# Patient Record
Sex: FEMALE | Race: OTHER
[De-identification: names, ages, dates, MRNs, and addresses within clinical notes are randomized per-mention and may not be internally consistent; named-entity substitution may affect disease eponyms.]

---

## 2018-11-19 LAB
ADD MANUAL DIFF: YES
ANION GAP SERPL CALC-SCNC: 7 MMOL/L (ref 5–15)
APPEARANCE UR: (no result)
APTT BLD: 25 SEC (ref 23–33)
APTT PPP: (no result) S
BUN SERPL-MCNC: 13 MG/DL (ref 7–18)
CALCIUM SERPL-MCNC: 9.9 MG/DL (ref 8.5–10.1)
CHLORIDE SERPL-SCNC: 103 MMOL/L (ref 98–107)
CO2 SERPL-SCNC: 30 MMOL/L (ref 21–32)
CREAT SERPL-MCNC: 0.8 MG/DL (ref 0.55–1.3)
ERYTHROCYTE [DISTWIDTH] IN BLOOD BY AUTOMATED COUNT: 11.5 % (ref 11.6–14.8)
GLUCOSE UR STRIP-MCNC: NEGATIVE MG/DL
HCT VFR BLD CALC: 44.6 % (ref 37–47)
HGB BLD-MCNC: 14.8 G/DL (ref 12–16)
INR PPP: 0.9 (ref 0.9–1.1)
KETONES UR QL STRIP: NEGATIVE
LEUKOCYTE ESTERASE UR QL STRIP: (no result)
MCV RBC AUTO: 91 FL (ref 80–99)
NITRITE UR QL STRIP: POSITIVE
PH UR STRIP: 7 [PH] (ref 4.5–8)
PLATELET # BLD: 275 K/UL (ref 150–450)
POTASSIUM SERPL-SCNC: 4.1 MMOL/L (ref 3.5–5.1)
PROT UR QL STRIP: (no result)
RBC # BLD AUTO: 4.88 M/UL (ref 4.2–5.4)
SODIUM SERPL-SCNC: 140 MMOL/L (ref 136–145)
SP GR UR STRIP: 1.01 (ref 1–1.03)
UROBILINOGEN UR-MCNC: NORMAL MG/DL (ref 0–1)
WBC # BLD AUTO: 5.9 K/UL (ref 4.8–10.8)

## 2018-11-19 NOTE — DIAGNOSTIC IMAGING REPORT
Indication: Cough

 

Technique: 2 views of the chest

 

Comparison: None

 

Findings: There is a right chest port catheter in place. Lungs and pleural spaces are

clear. Heart size is normal.

 

Impression: No acute process

## 2018-11-20 NOTE — CARDIOLOGY REPORT
--------------- APPROVED REPORT --------------





EKG Measurement

Heart [id number]

DE [id number]

[id number]

[id number]

[id number]





Normal sinus rhythm

Normal ECG
I personally performed the service described in the documentation recorded by the scribe in my presence, and it accurately and completely records my words and actions.

## 2018-11-27 NOTE — PRE-OP HX & PHY REPO 2 SIG
DATE OF ADMISSION:  11/29/2018

PREOPERATIVE HISTORY AND PHYSICAL



SCHEDULED FOR SURGERY:  November 29, 2018.



HISTORY OF PRESENT ILLNESS:  The patient is a 48-year-old female in overall

good health with invasive ductal carcinoma of the left breast, metastatic

to left axillary lymph nodes.  The patient has completed a course of

neoadjuvant chemotherapy and is now scheduled to undergo left breast

partial mastectomy with preoperative needle localization and left axillary

lymph node biopsy.  The patient presented in May 2018 with a 4 x 3 cm mass

in the upper outer quadrant of the left breast 5 cm from the nipple, and 

enlarged axillary lymph nodes were palpable.  Core biopsy revealed

invasive ductal carcinoma, metastatic to the axillary lymph node, estrogen

and progesterone receptor positive, HER2 negative.  The patient's last

chemotherapy was October 19, 2018. 



MEDICATIONS:  Compazine, Zofran, Norco.



ALLERGIES:  None.



OPERATIONS:  None.  



REVIEW OF SYSTEMS: The patient is premenopausal.



ALLERGIES:  None.



OPERATIONS:  None.



PHYSICAL EXAMINATION:

GENERAL:  The patient is 5 foot 3 inches, 170 pounds.

HEENT:  Within normal limits.

LUNGS:  Clear.

HEART:  Regular rhythm.

BREASTS:  Right breast without palpable abnormality.  The left breast, no

palpable mass present any longer in the upper outer quadrant.

ABDOMEN:  Soft.

PELVIC AND RECTAL:  Per primary care Gynecology.

EXTREMITIES:  Without edema.

NEUROLOGIC:  Physiologic.



ADDITIONAL STUDIES:  Recent ultrasound November 1, 2018 revealed that the

left breast mass at 12 o'clock 4 cm from the nipple has markedly decreased

in size, less than 2 cm and the lobulated retroareolar mass thought to be

a satellite lesion is no longer present indicating complete response to

chemotherapy.



PLAN:  The patient is scheduled to undergo left breast partial mastectomy

with preoperative needle localization and left axillary lymph node

dissection.  I have had a full discussion with the patient regarding the

nature of her condition, the nature of the surgery, indications,

alternatives, options, and risks including bleeding, infection, injury to

adjacent structures or organs, scarring, need for additional procedures in

the breast or axilla based on final pathology, need for additional

treatment postoperatively including chemotherapy and radiation

therapy, need for drains, breast deformity, etc.  All questions have been

answered.  The patient understands and agrees to proceed.









  ______________________________________________

  Adalberto Villela M.D.





DR:  Patria

D:  11/27/2018 13:36

T:  11/27/2018 14:58

JOB#:  [id number]

CC:



JASVIR

## 2018-11-29 ENCOUNTER — HOSPITAL ENCOUNTER (INPATIENT)
Dept: HOSPITAL 72 - SUR | Age: 48
LOS: 3 days | Discharge: HOME | DRG: 362 | End: 2018-12-02
Payer: MEDICAID

## 2018-11-29 VITALS — SYSTOLIC BLOOD PRESSURE: 103 MMHG | DIASTOLIC BLOOD PRESSURE: 68 MMHG

## 2018-11-29 VITALS — BODY MASS INDEX: 28.88 KG/M2 | HEIGHT: 63 IN | WEIGHT: 163 LBS

## 2018-11-29 VITALS — SYSTOLIC BLOOD PRESSURE: 106 MMHG | DIASTOLIC BLOOD PRESSURE: 57 MMHG

## 2018-11-29 VITALS — SYSTOLIC BLOOD PRESSURE: 154 MMHG | DIASTOLIC BLOOD PRESSURE: 94 MMHG

## 2018-11-29 VITALS — SYSTOLIC BLOOD PRESSURE: 130 MMHG | DIASTOLIC BLOOD PRESSURE: 94 MMHG

## 2018-11-29 VITALS — SYSTOLIC BLOOD PRESSURE: 117 MMHG | DIASTOLIC BLOOD PRESSURE: 76 MMHG

## 2018-11-29 VITALS — SYSTOLIC BLOOD PRESSURE: 120 MMHG | DIASTOLIC BLOOD PRESSURE: 72 MMHG

## 2018-11-29 VITALS — SYSTOLIC BLOOD PRESSURE: 129 MMHG | DIASTOLIC BLOOD PRESSURE: 77 MMHG

## 2018-11-29 VITALS — DIASTOLIC BLOOD PRESSURE: 78 MMHG | SYSTOLIC BLOOD PRESSURE: 137 MMHG

## 2018-11-29 VITALS — SYSTOLIC BLOOD PRESSURE: 114 MMHG | DIASTOLIC BLOOD PRESSURE: 76 MMHG

## 2018-11-29 VITALS — DIASTOLIC BLOOD PRESSURE: 83 MMHG | SYSTOLIC BLOOD PRESSURE: 128 MMHG

## 2018-11-29 VITALS — DIASTOLIC BLOOD PRESSURE: 70 MMHG | SYSTOLIC BLOOD PRESSURE: 114 MMHG

## 2018-11-29 VITALS — DIASTOLIC BLOOD PRESSURE: 67 MMHG | SYSTOLIC BLOOD PRESSURE: 113 MMHG

## 2018-11-29 VITALS — DIASTOLIC BLOOD PRESSURE: 78 MMHG | SYSTOLIC BLOOD PRESSURE: 115 MMHG

## 2018-11-29 DIAGNOSIS — C50.812: Primary | ICD-10-CM

## 2018-11-29 DIAGNOSIS — Z17.0: ICD-10-CM

## 2018-11-29 DIAGNOSIS — C77.3: ICD-10-CM

## 2018-11-29 LAB
APPEARANCE UR: CLEAR
APTT PPP: (no result) S
GLUCOSE UR STRIP-MCNC: NEGATIVE MG/DL
KETONES UR QL STRIP: NEGATIVE
LEUKOCYTE ESTERASE UR QL STRIP: (no result)
NITRITE UR QL STRIP: POSITIVE
PH UR STRIP: 7 [PH] (ref 4.5–8)
PROT UR QL STRIP: NEGATIVE
SP GR UR STRIP: 1.01 (ref 1–1.03)
UROBILINOGEN UR-MCNC: NORMAL MG/DL (ref 0–1)

## 2018-11-29 PROCEDURE — 0HTU0ZZ RESECTION OF LEFT BREAST, OPEN APPROACH: ICD-10-PCS

## 2018-11-29 PROCEDURE — 36415 COLL VENOUS BLD VENIPUNCTURE: CPT

## 2018-11-29 PROCEDURE — 81001 URINALYSIS AUTO W/SCOPE: CPT

## 2018-11-29 PROCEDURE — 94150 VITAL CAPACITY TEST: CPT

## 2018-11-29 PROCEDURE — 94003 VENT MGMT INPAT SUBQ DAY: CPT

## 2018-11-29 PROCEDURE — 85025 COMPLETE CBC W/AUTO DIFF WBC: CPT

## 2018-11-29 PROCEDURE — 85730 THROMBOPLASTIN TIME PARTIAL: CPT

## 2018-11-29 PROCEDURE — 93005 ELECTROCARDIOGRAM TRACING: CPT

## 2018-11-29 PROCEDURE — 07B60ZX EXCISION OF LEFT AXILLARY LYMPHATIC, OPEN APPROACH, DIAGNOSTIC: ICD-10-PCS

## 2018-11-29 PROCEDURE — 87086 URINE CULTURE/COLONY COUNT: CPT

## 2018-11-29 PROCEDURE — 80048 BASIC METABOLIC PNL TOTAL CA: CPT

## 2018-11-29 PROCEDURE — 87081 CULTURE SCREEN ONLY: CPT

## 2018-11-29 PROCEDURE — 81003 URINALYSIS AUTO W/O SCOPE: CPT

## 2018-11-29 PROCEDURE — 85007 BL SMEAR W/DIFF WBC COUNT: CPT

## 2018-11-29 PROCEDURE — 87181 SC STD AGAR DILUTION PER AGT: CPT

## 2018-11-29 PROCEDURE — 81025 URINE PREGNANCY TEST: CPT

## 2018-11-29 PROCEDURE — 71046 X-RAY EXAM CHEST 2 VIEWS: CPT

## 2018-11-29 PROCEDURE — 85610 PROTHROMBIN TIME: CPT

## 2018-11-29 RX ADMIN — DIPHENHYDRAMINE HYDROCHLORIDE PRN MG: 50 INJECTION INTRAMUSCULAR; INTRAVENOUS at 17:19

## 2018-11-29 RX ADMIN — DEXTROSE, SODIUM CHLORIDE, AND POTASSIUM CHLORIDE SCH MLS/HR: 5; .45; .15 INJECTION INTRAVENOUS at 16:11

## 2018-11-29 RX ADMIN — HYDROCODONE BITARTRATE AND ACETAMINOPHEN PRN TAB: 5; 325 TABLET ORAL at 20:09

## 2018-11-29 NOTE — PRE-PROCEDURE NOTE/ATTESTATION
Pre-Procedure Note/Attestation


Complete Prior to Procedure


Planned Procedure:  left


Procedure Narrative:


left breast partial mastectomy with pre-operative needle localization and left 

axillary lymph node biopsy





Indications for Procedure


Pre-Operative Diagnosis:


invasive ductal carcinoma left breast





Attestation


I attest that I discussed the nature of the procedure; its benefits; risks and 

complications; and alternatives (and the risks and benefits of such alternatives

), prior to the procedure, with the patient (or the patient's legal 

representative).





I attest that, if there was a reasonable possibility of needing a blood 

transfusion, the patient (or the patient's legal representative) was given the 

[facility] standardized written summary, pursuant 

to the Fernie Tish Blood Safety Act ([place] Health and Safety Code # 1645, as 

amended).





I attest that I re-evaluated the patient just prior to the surgery and that 

there has been no change in the patient's H&P, except as documented below:none











Adalberto Villela MD Nov 29, 2018 10:46

## 2018-11-29 NOTE — BRIEF OPERATIVE NOTE
Immediate Post Operative Note


Operative Note


Pre-op Diagnosis:


invasive ductal carcinoma left breast


Procedure:


left modified radical mastectomy


Post-op Diagnosis:


same


Post-op Diagnosis:  same as pre-op


Findings:  consistent w/pre-op dx studies, other - extensive tumor mass 

extending to nipple-areola complex


Surgeon:  alayna


Anesthesiologist:  [place]


Anesthesia:  general


Specimen:  yes - left breast and axillary nodes


Complications:  none


Condition:  stable


Fluids:  see anesthesia record


Estimated Blood Loss:  minimal


Drains:  other - Bon drain x 2


Implant(s) used?:  No











Adalberto Villela MD Nov 29, 2018 13:11

## 2018-11-29 NOTE — 48 HOUR POST ANESTHESIA EVAL
Post Anesthesia Evaluation


Procedure:  Left breast mastectomy


Date of Evaluation:  Nov 29, 2018


Time of Evaluation:  18:52


Blood Pressure Systolic:  130


0:  94


Pulse Rate:  102


Respiratory Rate:  19


Temperature (Fahrenheit):  98


Airway:  patent


Nausea:  No


Vomiting:  No


Pain Intensity:  3


Cardiopulmonary Status:


Stable


Follow-up Care/Observations:


0


Post-Anesthesia Complications:


0


Follow-up care needed:  ready to discharge











Gaston Sanchez MD Nov 29, 2018 18:53

## 2018-11-29 NOTE — OPERATIVE NOTE - DICTATED
DATE OF OPERATION:  11/29/2018

SURGEON:  Adalberto Villela M.D.



ASSISTANT:  None.



ANESTHESIOLOGIST:  Dr. Hurtado.



TYPE OF ANESTHESIA:  General endotracheal.



PREOPERATIVE DIAGNOSIS:  Invasive ductal carcinoma, left breast, metastatic

to left axillary lymph node.



POSTOPERATIVE DIAGNOSIS:  Invasive ductal carcinoma, left breast,

metastatic to left axillary lymph node.



OPERATION PERFORMED:  Left breast modified radical mastectomy.



INDICATIONS:  The patient presented with a large mass in the superior and

upper outer quadrant of the left breast with a lesion extending

retroareolar.  She completed a course of neoadjuvant chemotherapy with

marked decrease in the mass.  Initial attempts at partial mastectomy

revealed the mass extended completely under the areola and a total

mastectomy was required.  The patient is ER and [place] positive, HER2 negative.



DESCRIPTION OF PROCEDURE:  The patient was taken to the operating room and

under general anesthesia with sequential compression device stockings in

place, she was prepped and draped in the usual fashion incorporating the left

upper extremity into the sterile field.  I initially made a transverse

incision above the areolar margin and dissected flaps superiorly bringing

the wire into the field.  It became evident that the mass extended under

the nipple-areolar complex where a partial mastectomy would not be

adequate.  Accordingly, a Fausto type incision was outlined and flaps

dissected to the clavicle, sternum, costal margin, and the latissimus dorsi.

The breast was excised including the pectoralis fascia and the lower level

axillary dissection using clips and 2-0 Vicryl ties for hemostasis as well

as cautery. The field was irrigated with sterile water and then antibiotic

solution.  Hemostasis was secure.  With two separate stab incisions

inferolaterally 19 mm Bon drains were placed, one under the flaps and

one into the axilla and each was sutured to the skin with 2-0 nylon

suture.  After ascertaining that hemostasis was secure the incision was

closed with interrupted inverted 2-0 Vicryl followed by staples.  With

charging of the drains, there was good apposition of the flaps to the

chest wall and the axilla.  Dry sterile dressings were applied.  Final

sponge and needle counts were correct.  The patient tolerated the

procedure well and left the operating room in good condition.









  ______________________________________________

  Adalberto Villela M.D.





DR:  Patria

D:  11/29/2018 13:42

T:  11/29/2018 19:22

JOB#:  [id number]

CC:



JASVIR

## 2018-11-29 NOTE — IMMEDIATE POST-OP EVALUATION
Immediate Post-Op Evalulation


Immediate Post-Op Evalulation


Procedure:  Left breast mastectomy


Date of Evaluation:  Nov 29, 2018


Time of Evaluation:  13:03


IV Fluids:  1.5L


Blood Products:  0


Estimated Blood Loss:  min


Urinary Output:  0


Blood Pressure Systolic:  103


Blood Pressure Diastolic:  64


Pulse Rate:  76


Respiratory Rate:  16


O2 Sat by Pulse Oximetry:  100


Temperature (Fahrenheit):  97


Pain Score (1-10):  0


Nausea:  No


Vomiting:  No


Complications


0


Patient Status:  awake, reacts, patent, none


Hydration Status:  adequate


Drug:  Levquin 500mg


Given Within 1 Hr of Incision:  Yes


Time Given:  11:15











Laly Dent MD Nov 29, 2018 13:03

## 2018-11-29 NOTE — ANETHESIA PREOPERATIVE EVAL
Anesthesia Pre-op PMH/ROS


General


Date of Evaluation:  Nov 29, 2018


Anesthesiologist:  [place]


ASA Score:  ASA 3


Mallampati Score


Class I : Soft palate, uvula, fauces, pillars visible


Class II: Soft palate, uvula, fauces visible


Class III: Soft palate, base of uvula visible


Class IV: Only hard plate visible


Mallampati Classification:  Class III


Surgeon:  Tika


Diagnosis:  Left breast carcinoma


Surgical Procedure:  Left breast partial mastectomy with lymph node biopsy


Anesthesia History:  none


Family History:  no anesthesia problems


Allergies:  


Coded Allergies:  


     No Known Allergies (Unverified , 11/28/18)


Patient NPO?:  Yes


NPO Date:  Nov 28, 2018


NPO Time:  1800





Past Medical History


Cardiovascular:  Reports: other - HLD; 


   Denies: HTN, CAD, MI, valve dz, arrhythmia


Pulmonary:  Denies: asthma, COPD, LEONOR, other


Gastrointestinal/Genitourinary:  Denies: GERD, CRI, ESRD, other


Neurologic/Psychiatric:  Reports: other - migraines; 


   Denies: dementia, CVA, depression/anxiety, TIA


Endocrine:  Denies: DM, hypothyroidism, steroids, other


HEENT:  Denies: cataract (L), cataract (R), glaucoma, [place] (L), [place] (R), other


Hematology/Immune:  Reports: other - left breast carcinoma s/p chemo-last 

session 1 month ago; 


   Denies: anemia, DVT, bleeding disorder


Musculoskeletal/Integumentary:  Denies: OA, RA, DJD, DDD, edema, other





Anesthesia Pre-op Phys. Exam


Physician Exam





Last Vital Signs








  Date Time  Temp Pulse Resp B/P (MAP) Pulse Ox O2 Delivery O2 Flow Rate FiO2


 


11/29/18 09:21      Room Air  


 


11/29/18 09:14 98.1 92 20 128/83 (98) 98   








Constitutional:  NAD


Cardiovascular:  RRR


Respiratory:  CTA





Airway Exam


Mallampati Score:  Class III


MO:  limited


ROM:  full


Teeth:  intact





Anesthesia Pre-op A/P


Labs


see chart


Urine Pregnancy Test











Test


  11/29/18


08:00


 


Urine HCG, Qualitative


  Negative


(NEGATIVE)











Studies


Pre-op Studies:  EKG - sr, CXR - No acute process





Risk Assessment & Plan


Assessment:


ASA III


Plan:


GA


Status Change Before Surgery:  No





Pre-Antibiotics


Drug:  Levaquin 500mg


Given Within 1 Hr of Incision:  Yes


Time Given:  11:15











Mandujano-[place],Laly MD Nov 29, 2018 10:59

## 2018-11-30 VITALS — SYSTOLIC BLOOD PRESSURE: 144 MMHG | DIASTOLIC BLOOD PRESSURE: 86 MMHG

## 2018-11-30 VITALS — DIASTOLIC BLOOD PRESSURE: 95 MMHG | SYSTOLIC BLOOD PRESSURE: 152 MMHG

## 2018-11-30 VITALS — DIASTOLIC BLOOD PRESSURE: 76 MMHG | SYSTOLIC BLOOD PRESSURE: 121 MMHG

## 2018-11-30 VITALS — DIASTOLIC BLOOD PRESSURE: 95 MMHG | SYSTOLIC BLOOD PRESSURE: 132 MMHG

## 2018-11-30 VITALS — DIASTOLIC BLOOD PRESSURE: 100 MMHG | SYSTOLIC BLOOD PRESSURE: 155 MMHG

## 2018-11-30 VITALS — DIASTOLIC BLOOD PRESSURE: 89 MMHG | SYSTOLIC BLOOD PRESSURE: 150 MMHG

## 2018-11-30 VITALS — SYSTOLIC BLOOD PRESSURE: 151 MMHG | DIASTOLIC BLOOD PRESSURE: 103 MMHG

## 2018-11-30 RX ADMIN — DIPHENHYDRAMINE HYDROCHLORIDE PRN MG: 50 INJECTION INTRAMUSCULAR; INTRAVENOUS at 02:01

## 2018-11-30 RX ADMIN — DIPHENHYDRAMINE HYDROCHLORIDE PRN MG: 50 INJECTION INTRAMUSCULAR; INTRAVENOUS at 08:19

## 2018-11-30 RX ADMIN — LEVOFLOXACIN SCH MG: 500 TABLET, FILM COATED ORAL at 08:19

## 2018-11-30 RX ADMIN — HYDROCODONE BITARTRATE AND ACETAMINOPHEN PRN TAB: 5; 325 TABLET ORAL at 20:50

## 2018-11-30 RX ADMIN — HYDROCODONE BITARTRATE AND ACETAMINOPHEN PRN TAB: 5; 325 TABLET ORAL at 14:46

## 2018-11-30 RX ADMIN — DEXTROSE, SODIUM CHLORIDE, AND POTASSIUM CHLORIDE SCH MLS/HR: 5; .45; .15 INJECTION INTRAVENOUS at 01:52

## 2018-11-30 NOTE — GENERAL PROGRESS NOTE
Progress Note


Progress Note


T 100.7  Had emesis last night but able to eat breakfast this AM. Required 

Dilaudid SQ for pain overnight


Incision clean.


MARIELA 55+16


Urine C&S from pre-operative on admission: gram negative bacillus


Imp. Post-op N/V and pain - improved


       Fever due to atelectasis likely


       UTI present on admission - on Levaquin pending sensitivities


Plan; Continue in hospital


        d/c IV fluids if tolerating po well


        pulm toilet increase activity


        teach re care of MARIELA drains


        f/u labs











Adalberto Villela MD Nov 30, 2018 10:35

## 2018-12-01 VITALS — SYSTOLIC BLOOD PRESSURE: 151 MMHG | DIASTOLIC BLOOD PRESSURE: 89 MMHG

## 2018-12-01 VITALS — SYSTOLIC BLOOD PRESSURE: 127 MMHG | DIASTOLIC BLOOD PRESSURE: 89 MMHG

## 2018-12-01 VITALS — SYSTOLIC BLOOD PRESSURE: 115 MMHG | DIASTOLIC BLOOD PRESSURE: 78 MMHG

## 2018-12-01 VITALS — SYSTOLIC BLOOD PRESSURE: 143 MMHG | DIASTOLIC BLOOD PRESSURE: 84 MMHG

## 2018-12-01 VITALS — DIASTOLIC BLOOD PRESSURE: 73 MMHG | SYSTOLIC BLOOD PRESSURE: 124 MMHG

## 2018-12-01 LAB
ADD MANUAL DIFF: NO
ANION GAP SERPL CALC-SCNC: 8 MMOL/L (ref 5–15)
BASOPHILS NFR BLD AUTO: 0.9 % (ref 0–2)
BUN SERPL-MCNC: 9 MG/DL (ref 7–18)
CALCIUM SERPL-MCNC: 9.2 MG/DL (ref 8.5–10.1)
CHLORIDE SERPL-SCNC: 103 MMOL/L (ref 98–107)
CO2 SERPL-SCNC: 28 MMOL/L (ref 21–32)
CREAT SERPL-MCNC: 0.8 MG/DL (ref 0.55–1.3)
EOSINOPHIL NFR BLD AUTO: 6.9 % (ref 0–3)
ERYTHROCYTE [DISTWIDTH] IN BLOOD BY AUTOMATED COUNT: 11.8 % (ref 11.6–14.8)
HCT VFR BLD CALC: 40.5 % (ref 37–47)
HGB BLD-MCNC: 13.6 G/DL (ref 12–16)
LYMPHOCYTES NFR BLD AUTO: 30.4 % (ref 20–45)
MCV RBC AUTO: 90 FL (ref 80–99)
MONOCYTES NFR BLD AUTO: 10.3 % (ref 1–10)
NEUTROPHILS NFR BLD AUTO: 51.5 % (ref 45–75)
PLATELET # BLD: 198 K/UL (ref 150–450)
POTASSIUM SERPL-SCNC: 3.6 MMOL/L (ref 3.5–5.1)
RBC # BLD AUTO: 4.5 M/UL (ref 4.2–5.4)
SODIUM SERPL-SCNC: 139 MMOL/L (ref 136–145)
WBC # BLD AUTO: 4.4 K/UL (ref 4.8–10.8)

## 2018-12-01 RX ADMIN — LEVOFLOXACIN SCH MG: 500 TABLET, FILM COATED ORAL at 08:42

## 2018-12-01 RX ADMIN — HYDROCODONE BITARTRATE AND ACETAMINOPHEN PRN TAB: 5; 325 TABLET ORAL at 04:29

## 2018-12-01 RX ADMIN — HYDROCODONE BITARTRATE AND ACETAMINOPHEN PRN TAB: 5; 325 TABLET ORAL at 12:22

## 2018-12-01 RX ADMIN — HYDROCODONE BITARTRATE AND ACETAMINOPHEN PRN TAB: 5; 325 TABLET ORAL at 21:55

## 2018-12-01 RX ADMIN — HYDROCODONE BITARTRATE AND ACETAMINOPHEN PRN TAB: 5; 325 TABLET ORAL at 18:09

## 2018-12-01 RX ADMIN — HYDROCODONE BITARTRATE AND ACETAMINOPHEN PRN TAB: 5; 325 TABLET ORAL at 08:41

## 2018-12-01 NOTE — GENERAL PROGRESS NOTE
Progress Note


Progress Note


T 100.7  Nausea resolved.  Pre-op admission urine C&S - E. coli UTI - sensitive 

to Levaquin


left mastectomy incision is clean.


MARIELA: 54+23 serosang


WBC low 4400 (recent chemotherapy) [id number]


Imp. Fever


Plan: Continue in-patient care and RN teaching of drain care


        Continue q4h VS and Renettauin











Adalberto Villela MD Dec 1, 2018 09:27

## 2018-12-02 VITALS — DIASTOLIC BLOOD PRESSURE: 88 MMHG | SYSTOLIC BLOOD PRESSURE: 130 MMHG

## 2018-12-02 VITALS — DIASTOLIC BLOOD PRESSURE: 74 MMHG | SYSTOLIC BLOOD PRESSURE: 150 MMHG

## 2018-12-02 VITALS — DIASTOLIC BLOOD PRESSURE: 82 MMHG | SYSTOLIC BLOOD PRESSURE: 121 MMHG

## 2018-12-02 RX ADMIN — HYDROCODONE BITARTRATE AND ACETAMINOPHEN PRN TAB: 5; 325 TABLET ORAL at 04:41

## 2018-12-02 RX ADMIN — LEVOFLOXACIN SCH MG: 500 TABLET, FILM COATED ORAL at 08:16

## 2018-12-02 NOTE — GENERAL PROGRESS NOTE
Progress Note


Progress Note


Afebrile and doing better with po meds only


mastectomy incision clean, dry


MARIELA drains: 60+33 more serous


Imp. Improved


Plan: Discharge with MARIELA drains


        Instructions/limitations discussed


        Rx Norco 5/325 #40


        f/u office 12/5











Adalberto Villela MD Dec 2, 2018 09:48

## 2018-12-04 NOTE — DISCHARGE SUMMARY
Discharge Summary


Hospital Course


Date of Admission


Nov 29, 2018 at 14:36


Date of Discharge


Dec 2, 2018 at 10:40


Admitting Diagnosis


LEFT BREAST CARCINOMA


Reason for Hospitalization:  elective surgery


HPI


Allyn Andrade is a 48 year old female who was admitted on Nov 29, 2018 at 14:36 for left breast carcinoma.


Patient was admitted for elective surgery.


Procedures








 s/p 11/29/18 by dr Villela


Left breast modified radical mastectomy.


Hospital Course


s/p surgery


initially IV fluids 


pain management 


initially postoperative fever possibly  secondary to atelectasis


urinalysis + UTI, urine culture + E coli


started on oral Levaquin and  discontinue prior to discharge , fever resolved 


incentive spirometry encouraged while in the bed 


patient was out of bed  and ambulated as tolerated, encouraged frequent 

ambulation  


output from Rg-Steele was closely monitored 


patient was instructed  how to manage Rg-Steele drains at home, record 

output  and empty them 


diet was slowly advanced 


patient was able to tolerate diet 


pain   controlled 


mastectomy incision clean and intact 


patient ambulated 


voided freely 


bowel regimen instituted  


patient   was stable for discharge home 


discharge instruction provided 


prescription for Norco provided


patient to  follow-up with  surgeon on 12 / 5


pathology  report back and revealed invasive ductal carcinoma grade 2.  All 

margins negative for carcinoma.  Metastatic carcinoma identified in one out of 

2 lymph nodes.


 


FINAL DIAGNOSES


Invasive ductal carcinoma grade 2,  left breast, metastatic to left axillary 

lymph node


s/p left breast modified radical mastectomy  


E coli UTI, s/p treatment





Discharge Medications


Discontinued Medications:  


No Known Medications* (NKM - No Known Medications*)  .


0 ., 0 Refills











Discharge


Condition Upon Discharge:  stable


Discharge Disposition


Patient was discharged to Home (01)





Discharge Instructions


Discharge Instructions


Special Instructions


I have been assigned to complete a D/C Summary on this account. I was not 

involved in the patient management











Yasemin Leggett NP Dec 4, 2018 14:44